# Patient Record
Sex: MALE | Race: ASIAN | NOT HISPANIC OR LATINO | URBAN - METROPOLITAN AREA
[De-identification: names, ages, dates, MRNs, and addresses within clinical notes are randomized per-mention and may not be internally consistent; named-entity substitution may affect disease eponyms.]

---

## 2021-02-06 ENCOUNTER — EMERGENCY (EMERGENCY)
Facility: HOSPITAL | Age: 51
LOS: 1 days | Discharge: HOME | End: 2021-02-06
Attending: EMERGENCY MEDICINE | Admitting: EMERGENCY MEDICINE
Payer: MEDICAID

## 2021-02-06 VITALS
SYSTOLIC BLOOD PRESSURE: 133 MMHG | HEART RATE: 75 BPM | OXYGEN SATURATION: 100 % | DIASTOLIC BLOOD PRESSURE: 83 MMHG | RESPIRATION RATE: 16 BRPM | TEMPERATURE: 96 F

## 2021-02-06 LAB
ALBUMIN SERPL ELPH-MCNC: 4.8 G/DL — SIGNIFICANT CHANGE UP (ref 3.3–5)
ALP SERPL-CCNC: 72 U/L — SIGNIFICANT CHANGE UP (ref 40–120)
ALT FLD-CCNC: 26 U/L — SIGNIFICANT CHANGE UP (ref 4–41)
ANION GAP SERPL CALC-SCNC: 11 MMOL/L — SIGNIFICANT CHANGE UP (ref 7–14)
AST SERPL-CCNC: 20 U/L — SIGNIFICANT CHANGE UP (ref 4–40)
BASOPHILS # BLD AUTO: 0.07 K/UL — SIGNIFICANT CHANGE UP (ref 0–0.2)
BASOPHILS NFR BLD AUTO: 0.9 % — SIGNIFICANT CHANGE UP (ref 0–2)
BILIRUB SERPL-MCNC: 0.3 MG/DL — SIGNIFICANT CHANGE UP (ref 0.2–1.2)
BUN SERPL-MCNC: 16 MG/DL — SIGNIFICANT CHANGE UP (ref 7–23)
CALCIUM SERPL-MCNC: 9.8 MG/DL — SIGNIFICANT CHANGE UP (ref 8.4–10.5)
CHLORIDE SERPL-SCNC: 100 MMOL/L — SIGNIFICANT CHANGE UP (ref 98–107)
CO2 SERPL-SCNC: 27 MMOL/L — SIGNIFICANT CHANGE UP (ref 22–31)
CREAT SERPL-MCNC: 0.95 MG/DL — SIGNIFICANT CHANGE UP (ref 0.5–1.3)
EOSINOPHIL # BLD AUTO: 0.27 K/UL — SIGNIFICANT CHANGE UP (ref 0–0.5)
EOSINOPHIL NFR BLD AUTO: 3.3 % — SIGNIFICANT CHANGE UP (ref 0–6)
GLUCOSE SERPL-MCNC: 142 MG/DL — HIGH (ref 70–99)
HCT VFR BLD CALC: 42.2 % — SIGNIFICANT CHANGE UP (ref 39–50)
HGB BLD-MCNC: 13.9 G/DL — SIGNIFICANT CHANGE UP (ref 13–17)
IANC: 4.04 K/UL — SIGNIFICANT CHANGE UP (ref 1.5–8.5)
IMM GRANULOCYTES NFR BLD AUTO: 0.2 % — SIGNIFICANT CHANGE UP (ref 0–1.5)
LYMPHOCYTES # BLD AUTO: 3.06 K/UL — SIGNIFICANT CHANGE UP (ref 1–3.3)
LYMPHOCYTES # BLD AUTO: 37.4 % — SIGNIFICANT CHANGE UP (ref 13–44)
MAGNESIUM SERPL-MCNC: 1.8 MG/DL — SIGNIFICANT CHANGE UP (ref 1.6–2.6)
MCHC RBC-ENTMCNC: 27.9 PG — SIGNIFICANT CHANGE UP (ref 27–34)
MCHC RBC-ENTMCNC: 32.9 GM/DL — SIGNIFICANT CHANGE UP (ref 32–36)
MCV RBC AUTO: 84.6 FL — SIGNIFICANT CHANGE UP (ref 80–100)
MONOCYTES # BLD AUTO: 0.73 K/UL — SIGNIFICANT CHANGE UP (ref 0–0.9)
MONOCYTES NFR BLD AUTO: 8.9 % — SIGNIFICANT CHANGE UP (ref 2–14)
NEUTROPHILS # BLD AUTO: 4.04 K/UL — SIGNIFICANT CHANGE UP (ref 1.8–7.4)
NEUTROPHILS NFR BLD AUTO: 49.3 % — SIGNIFICANT CHANGE UP (ref 43–77)
NRBC # BLD: 0 /100 WBCS — SIGNIFICANT CHANGE UP
NRBC # FLD: 0 K/UL — SIGNIFICANT CHANGE UP
NT-PROBNP SERPL-SCNC: 36 PG/ML — SIGNIFICANT CHANGE UP
PLATELET # BLD AUTO: 221 K/UL — SIGNIFICANT CHANGE UP (ref 150–400)
POTASSIUM SERPL-MCNC: 3.9 MMOL/L — SIGNIFICANT CHANGE UP (ref 3.5–5.3)
POTASSIUM SERPL-SCNC: 3.9 MMOL/L — SIGNIFICANT CHANGE UP (ref 3.5–5.3)
PROT SERPL-MCNC: 7.2 G/DL — SIGNIFICANT CHANGE UP (ref 6–8.3)
RBC # BLD: 4.99 M/UL — SIGNIFICANT CHANGE UP (ref 4.2–5.8)
RBC # FLD: 12.9 % — SIGNIFICANT CHANGE UP (ref 10.3–14.5)
SODIUM SERPL-SCNC: 138 MMOL/L — SIGNIFICANT CHANGE UP (ref 135–145)
TROPONIN T, HIGH SENSITIVITY RESULT: 7 NG/L — SIGNIFICANT CHANGE UP
TROPONIN T, HIGH SENSITIVITY RESULT: 9 NG/L — SIGNIFICANT CHANGE UP
WBC # BLD: 8.19 K/UL — SIGNIFICANT CHANGE UP (ref 3.8–10.5)
WBC # FLD AUTO: 8.19 K/UL — SIGNIFICANT CHANGE UP (ref 3.8–10.5)

## 2021-02-06 PROCEDURE — 71045 X-RAY EXAM CHEST 1 VIEW: CPT | Mod: 26

## 2021-02-06 PROCEDURE — 99285 EMERGENCY DEPT VISIT HI MDM: CPT

## 2021-02-06 RX ORDER — FAMOTIDINE 10 MG/ML
20 INJECTION INTRAVENOUS ONCE
Refills: 0 | Status: COMPLETED | OUTPATIENT
Start: 2021-02-06 | End: 2021-02-06

## 2021-02-06 RX ORDER — KETOROLAC TROMETHAMINE 30 MG/ML
15 SYRINGE (ML) INJECTION ONCE
Refills: 0 | Status: DISCONTINUED | OUTPATIENT
Start: 2021-02-06 | End: 2021-02-06

## 2021-02-06 RX ORDER — ASPIRIN/CALCIUM CARB/MAGNESIUM 324 MG
324 TABLET ORAL ONCE
Refills: 0 | Status: COMPLETED | OUTPATIENT
Start: 2021-02-06 | End: 2021-02-06

## 2021-02-06 RX ADMIN — Medication 15 MILLIGRAM(S): at 21:45

## 2021-02-06 RX ADMIN — Medication 324 MILLIGRAM(S): at 22:06

## 2021-02-06 RX ADMIN — FAMOTIDINE 20 MILLIGRAM(S): 10 INJECTION INTRAVENOUS at 21:45

## 2021-02-06 NOTE — ED ADULT NURSE NOTE - OBJECTIVE STATEMENT
Received patient to room 19 c/o chest pain. A&o4, ambulatory, stating started to have sudden onset of sharp midsternal chest pain a couple hours ago. NSR on monitor, vitals as noted. Denies fevers, chills, SOB, nausea, vomiting, headache. Breathing equal and unlabored, abdomen soft and nondistended. Hx of HTN and DM.  Right 18G AC placed, pending MD evaluation.

## 2021-02-06 NOTE — ED ADULT NURSE NOTE - CHIEF COMPLAINT QUOTE
c/o intermittent mid sternal chest pain radiating into back between shoulder blades x 1 week, worst today. Hx HTN, DM2. WO=[id number]

## 2021-02-07 VITALS
SYSTOLIC BLOOD PRESSURE: 108 MMHG | HEART RATE: 64 BPM | RESPIRATION RATE: 16 BRPM | DIASTOLIC BLOOD PRESSURE: 63 MMHG | OXYGEN SATURATION: 97 %

## 2021-02-07 LAB — SARS-COV-2 RNA SPEC QL NAA+PROBE: SIGNIFICANT CHANGE UP

## 2021-02-07 NOTE — ED PROVIDER NOTE - ATTENDING CONTRIBUTION TO CARE
I performed a face to face history and physical exam of the patient and discussed their management with the resident. I reviewed the resident's note and agree with the documented findings and plan of care.     51 y/o male with multiple risk factors p/w intermittent chest pain radiating to back, today worse, no SOB, no diaphoresis, no palpitations, no n/v, no fever, no chills,  no cough, on exam pt with mild discomfort unable to rate pain, lungs cta b/l, cardiac rrr, abd soft nt/nd, ext no calf tenderness, neuro intact, pt with multiple risk factors for ACS will 1)EKG,CXR 2)CBC. CMP, PT/PTT, Troponin, 3)admit

## 2021-02-07 NOTE — ED PROVIDER NOTE - PATIENT PORTAL LINK FT
You can access the FollowMyHealth Patient Portal offered by [facility] by registering at the following website: [email or web address]. By joining [employer]’s FollowMyHealth portal, you will also be able to view your health information using other applications (apps) compatible with our system.

## 2021-02-07 NOTE — ED PROVIDER NOTE - PROGRESS NOTE DETAILS
pt cp resolved after pepcid and toradol. would like to go home and see cardiologist on his own for echo and stress test. Patient understands risks associated with going home without full work up including worsening illness or death. pt accepts risk and will see cardiology as an outpatient. SAWYER: Pt is AxOx3, understands the risks of an incomplete workup, understands that we want him to stay and undergo further testing, serious concern of ACS, however pt wants to go home, scared of getting COVID(we discussed in great detail how we screen for patients who go to [facility] and that patients with COVID are not admitted there), rather see his cardiologist, understands that if he leaves he can have worsening symptoms, including death, pt accepts risk and would like to leave

## 2021-02-07 NOTE — ED PROVIDER NOTE - NSFOLLOWUPINSTRUCTIONS_ED_ALL_ED_FT
You were seen for chest pain.     You must follow up with cardiology for an echocardiogram and stress test for chest pain.     Please take 600 mg of Ibuprofen (aka Motrin, Advil) and/or 650 mg Acetaminophen (aka Tylenol) every 6 hours, as needed, for mild-moderate pain.      Seek immediate medical assistance for any new or worsening symptoms such as chest pain.

## 2021-02-07 NOTE — ED PROVIDER NOTE - OBJECTIVE STATEMENT
50M HTN DM HL, FMH heart dz, presents with chest pain intermittent for past few days, now worsening tonight while seated talking with friend. Radiating to the back. No diaphoresis, palpitations, sob or cough. No recent URI. No n/v/d. No trauma.

## 2021-02-07 NOTE — ED PROVIDER NOTE - CLINICAL SUMMARY MEDICAL DECISION MAKING FREE TEXT BOX
Tunde - adult male with chest pain concerning for cardiac ischemia will work up R/O acs, if all negative will CDU for echo and stress test.

## 2021-02-07 NOTE — ED PROVIDER NOTE - NSFOLLOWUPCLINICS_GEN_ALL_ED_FT
[facility]  Cardiology  [address] [place] [id number]  Phone: [phone]  Fax:   Follow Up Time: Urgent

## 2021-07-19 ENCOUNTER — EMERGENCY (EMERGENCY)
Facility: HOSPITAL | Age: 51
LOS: 1 days | Discharge: HOME | End: 2021-07-19
Admitting: EMERGENCY MEDICINE
Payer: COMMERCIAL

## 2021-07-19 VITALS
RESPIRATION RATE: 18 BRPM | DIASTOLIC BLOOD PRESSURE: 80 MMHG | SYSTOLIC BLOOD PRESSURE: 125 MMHG | OXYGEN SATURATION: 100 % | HEART RATE: 83 BPM | TEMPERATURE: 98 F

## 2021-07-19 PROBLEM — I10 ESSENTIAL (PRIMARY) HYPERTENSION: Chronic | Status: ACTIVE | Noted: 2021-02-07

## 2021-07-19 PROBLEM — E11.9 TYPE 2 DIABETES MELLITUS WITHOUT COMPLICATIONS: Chronic | Status: ACTIVE | Noted: 2021-02-07

## 2021-07-19 PROCEDURE — 99283 EMERGENCY DEPT VISIT LOW MDM: CPT

## 2021-07-19 PROCEDURE — 73630 X-RAY EXAM OF FOOT: CPT | Mod: 26,LT

## 2021-07-19 RX ORDER — IBUPROFEN 200 MG
600 TABLET ORAL ONCE
Refills: 0 | Status: COMPLETED | OUTPATIENT
Start: 2021-07-19 | End: 2021-07-19

## 2021-07-19 RX ORDER — IBUPROFEN 200 MG
1 TABLET ORAL
Qty: 20 | Refills: 0
Start: 2021-07-19 | End: 2021-07-23

## 2021-07-19 RX ADMIN — Medication 600 MILLIGRAM(S): at 13:03

## 2021-07-19 RX ADMIN — Medication 40 MILLIGRAM(S): at 13:02

## 2021-07-19 NOTE — ED PROVIDER NOTE - NSFOLLOWUPINSTRUCTIONS_ED_ALL_ED_FT
Bunion    WHAT YOU NEED TO KNOW:    A bunion is a bony lump at the base of your big toe. As it grows, it sticks out from the side of your foot and may move your toe out of place.     DISCHARGE INSTRUCTIONS:    Return to the emergency department if:     You have severe pain in your toe.    You cannot put weight on your foot.    Contact your healthcare provider if:     You cannot do your daily activities because of the pain.    You have questions or concerns about your condition or care.    Medicines: You may need any of the following:     Acetaminophen decreases pain. It is available without a doctor's order. Ask how much to take and how often to take it. Follow directions. Acetaminophen can cause liver damage if not taken correctly.    NSAIDs, such as ibuprofen, help decrease swelling, pain, and fever. This medicine is available with or without a doctor's order. NSAIDs can cause stomach bleeding or kidney problems in certain people. If you take blood thinner medicine, always ask your healthcare provider if NSAIDs are safe for you. Always read the medicine label and follow directions.    Take your medicine as directed. Contact your healthcare provider if you think your medicine is not helping or if you have side effects. Tell him of her if you are allergic to any medicine. Keep a list of the medicines, vitamins, and herbs you take. Include the amounts, and when and why you take them. Bring the list or the pill bottles to follow-up visits. Carry your medicine list with you in case of an emergency.    Self-care:     Use a bunion pad. Wear a thick, ring-shaped pad around and over the bunion to cushion it.    Wear shoes that fit well. Wear wide, low-heeled shoes that have plenty of room for your toes. Do not wear tight shoes or heels that are higher than 2 inches.     Wear shoe inserts or arch supports. These will decrease pressure on the bunion.    Separate your big toe at night. Separate the big toe from the others with a foam pad while you sleep. Use a light elastic bandage to keep the pad in place.     Stretch your foot each day. This will help decrease pressure and increase foot strength. Ask what foot exercises are best for you.    Apply ice on your toe for 15 to 20 minutes every hour or as directed. Use an ice pack or put crushed ice in a plastic bag. Cover it with a towel. Ice helps prevent tissue damage and decreases swelling and pain.    Go to physical therapy if directed. A physical therapist teaches you exercises to help improve movement and strength, and to decrease pain.     Follow up with your healthcare provider as directed: You may be referred to a podiatrist (foot specialist). Write down your questions so you remember to ask them during your visits.

## 2021-07-19 NOTE — ED PROVIDER NOTE - PATIENT PORTAL LINK FT
You can access the FollowMyHealth Patient Portal offered by [facility] by registering at the following website: [email or web address]. By joining [employer]’s FollowMyHealth portal, you will also be able to view your health information using other applications (apps) compatible with our system.

## 2021-07-19 NOTE — ED PROVIDER NOTE - OBJECTIVE STATEMENT
49 y/o M PMH HTN, HLD, DM c/o L foot base at the base of 1st digit x 2days. Denies trauma. States he was wearing new shoes a few days ago but states they weren't tight. Pt denies fever, chills.

## 2021-07-19 NOTE — ED PROVIDER NOTE - CLINICAL SUMMARY MEDICAL DECISION MAKING FREE TEXT BOX
49 y/o M PMH HTN, HLD, DM c/o L foot base at the base of 1st digit x 2days. Denies trauma. States he was wearing new shoes a few days ago but states they weren't tight. Pt denies fever, chills.  L foot: +DP, TP. Full ROM of foot and toes. tenderness to medial aspect of 1st metatarsal with slight overlaying erythema    likely gout  -motrin, prednisone, xr r/o fx

## 2021-07-19 NOTE — ED PROVIDER NOTE - PHYSICAL EXAMINATION
L foot: +DP, TP. Full ROM of foot and toes. tenderness to medial aspect of 1st metatarsal with slight overlaying erythema

## 2024-05-10 NOTE — ED ADULT NURSE NOTE - IS THE PATIENT ABLE TO BE SCREENED?
Yes
.  LABS:                         10.5   9.59  )-----------( 201      ( 09 May 2024 23:58 )             29.8     05-09    137  |  101  |  70<H>  ----------------------------<  158<H>  3.5   |  21<L>  |  6.54<H>    Ca    9.3      09 May 2024 23:58  Phos  5.2     05-09  Mg     1.9     05-09        Urinalysis Basic - ( 09 May 2024 23:58 )    Color: x / Appearance: x / SG: x / pH: x  Gluc: 158 mg/dL / Ketone: x  / Bili: x / Urobili: x   Blood: x / Protein: x / Nitrite: x   Leuk Esterase: x / RBC: x / WBC x   Sq Epi: x / Non Sq Epi: x / Bacteria: x            RADIOLOGY, EKG & ADDITIONAL TESTS: Reviewed.